# Patient Record
Sex: MALE | Race: ASIAN | NOT HISPANIC OR LATINO | URBAN - METROPOLITAN AREA
[De-identification: names, ages, dates, MRNs, and addresses within clinical notes are randomized per-mention and may not be internally consistent; named-entity substitution may affect disease eponyms.]

---

## 2023-12-06 ENCOUNTER — LAB OUTSIDE AN ENCOUNTER (OUTPATIENT)
Dept: URBAN - METROPOLITAN AREA CLINIC 105 | Facility: CLINIC | Age: 38
End: 2023-12-06

## 2023-12-06 ENCOUNTER — OFFICE VISIT (OUTPATIENT)
Dept: URBAN - METROPOLITAN AREA CLINIC 105 | Facility: CLINIC | Age: 38
End: 2023-12-06
Payer: COMMERCIAL

## 2023-12-06 VITALS
TEMPERATURE: 97.1 F | HEIGHT: 72 IN | WEIGHT: 270 LBS | HEART RATE: 78 BPM | SYSTOLIC BLOOD PRESSURE: 147 MMHG | BODY MASS INDEX: 36.57 KG/M2 | DIASTOLIC BLOOD PRESSURE: 87 MMHG

## 2023-12-06 DIAGNOSIS — R19.4 CHANGE IN BOWEL HABITS: ICD-10-CM

## 2023-12-06 DIAGNOSIS — R10.9 ABDOMINAL CRAMPING: ICD-10-CM

## 2023-12-06 DIAGNOSIS — R30.0 DYSURIA: ICD-10-CM

## 2023-12-06 DIAGNOSIS — R10.30 LOWER ABDOMINAL PAIN: ICD-10-CM

## 2023-12-06 DIAGNOSIS — R19.5 MUCUS IN STOOL: ICD-10-CM

## 2023-12-06 PROCEDURE — 99203 OFFICE O/P NEW LOW 30 MIN: CPT | Performed by: INTERNAL MEDICINE

## 2023-12-06 RX ORDER — MOXIFLOXACIN HYDROCHLORIDE 400 MG/1
1 TABLET TABLET, FILM COATED ORAL ONCE A DAY
Qty: 10 TABLET | Refills: 0 | OUTPATIENT
Start: 2023-12-06 | End: 2023-12-16

## 2023-12-06 NOTE — HPI-TODAY'S VISIT:
symptoms started around Thanksgiving. he ate late and ended up vomiting. next day he felt like his stomach was cramping in the lower abdomen. he did not have a bowel movement all day. he took some laxatives and had a bowel movement the next day. abdominal cramping started to get better. - he ate ice cream and he has been tender in the lower abdomen area. he has a sharp pain with a bowel movment. not having blood in the stool.  he says that he can't eat b/c he does not have much of an appetite. he is having some pain with urine. - he went to urgent care and had a urine test and an xray.

## 2023-12-07 ENCOUNTER — TELEPHONE ENCOUNTER (OUTPATIENT)
Dept: URBAN - METROPOLITAN AREA CLINIC 105 | Facility: CLINIC | Age: 38
End: 2023-12-07

## 2023-12-07 LAB
A/G RATIO: 1.4
ALBUMIN: 4.6
ALKALINE PHOSPHATASE: 144
ALT (SGPT): 70
AST (SGOT): 41
BILIRUBIN, TOTAL: 0.8
BUN/CREATININE RATIO: (no result)
BUN: 8
C-REACTIVE PROTEIN, QUANT: 201.9
CALCIUM: 9.8
CARBON DIOXIDE, TOTAL: 26
CHLORIDE: 101
CREATININE: 0.97
EGFR: 104
GLOBULIN, TOTAL: 3.4
GLUCOSE: 100
HEMATOCRIT: 43.7
HEMOGLOBIN: 14.5
MCH: 28.1
MCHC: 33.2
MCV: 84.7
MPV: 10.3
PLATELET COUNT: 449
POTASSIUM: 4.4
PROTEIN, TOTAL: 8
RDW: 12.3
RED BLOOD CELL COUNT: 5.16
SODIUM: 138
WHITE BLOOD CELL COUNT: 18.7

## 2023-12-08 ENCOUNTER — CLAIMS CREATED FROM THE CLAIM WINDOW (OUTPATIENT)
Dept: URBAN - METROPOLITAN AREA MEDICAL CENTER 33 | Facility: MEDICAL CENTER | Age: 38
End: 2023-12-08
Payer: COMMERCIAL

## 2023-12-08 DIAGNOSIS — K57.20 ABSCESS OF CECUM CONCURRENT WITH AND DUE TO DIVERTICULITIS: ICD-10-CM

## 2023-12-08 PROCEDURE — 99255 IP/OBS CONSLTJ NEW/EST HI 80: CPT | Performed by: INTERNAL MEDICINE

## 2023-12-09 ENCOUNTER — CLAIMS CREATED FROM THE CLAIM WINDOW (OUTPATIENT)
Dept: URBAN - METROPOLITAN AREA MEDICAL CENTER 33 | Facility: MEDICAL CENTER | Age: 38
End: 2023-12-09
Payer: COMMERCIAL

## 2023-12-09 DIAGNOSIS — K57.20 ABSCESS OF CECUM CONCURRENT WITH AND DUE TO DIVERTICULITIS: ICD-10-CM

## 2023-12-09 PROCEDURE — 99233 SBSQ HOSP IP/OBS HIGH 50: CPT | Performed by: INTERNAL MEDICINE

## 2023-12-13 ENCOUNTER — TELEPHONE ENCOUNTER (OUTPATIENT)
Dept: URBAN - METROPOLITAN AREA CLINIC 105 | Facility: CLINIC | Age: 38
End: 2023-12-13

## 2024-01-31 ENCOUNTER — OFFICE VISIT (OUTPATIENT)
Dept: URBAN - METROPOLITAN AREA CLINIC 105 | Facility: CLINIC | Age: 39
End: 2024-01-31
Payer: COMMERCIAL

## 2024-01-31 ENCOUNTER — DASHBOARD ENCOUNTERS (OUTPATIENT)
Age: 39
End: 2024-01-31

## 2024-01-31 VITALS
HEART RATE: 67 BPM | WEIGHT: 259.8 LBS | TEMPERATURE: 97.1 F | BODY MASS INDEX: 35.19 KG/M2 | SYSTOLIC BLOOD PRESSURE: 126 MMHG | DIASTOLIC BLOOD PRESSURE: 86 MMHG | HEIGHT: 72 IN

## 2024-01-31 DIAGNOSIS — R10.32 LLQ ABDOMINAL PAIN: ICD-10-CM

## 2024-01-31 DIAGNOSIS — R93.5 ABNORMAL ABDOMINAL CT SCAN: ICD-10-CM

## 2024-01-31 DIAGNOSIS — K57.92 DIVERTICULITIS: ICD-10-CM

## 2024-01-31 DIAGNOSIS — K57.20 DIVERTICULITIS OF LARGE INTESTINE WITH ABSCESS WITHOUT BLEEDING: ICD-10-CM

## 2024-01-31 PROBLEM — 307496006: Status: ACTIVE | Noted: 2024-01-31

## 2024-01-31 PROBLEM — 4494009: Status: ACTIVE | Noted: 2024-01-31

## 2024-01-31 PROCEDURE — 99213 OFFICE O/P EST LOW 20 MIN: CPT | Performed by: INTERNAL MEDICINE

## 2024-01-31 RX ORDER — SODIUM, POTASSIUM,MAG SULFATES 17.5-3.13G
177 ML SOLUTION, RECONSTITUTED, ORAL ORAL
Qty: 1 KIT | Refills: 0 | OUTPATIENT
Start: 2024-01-31 | End: 2024-02-01

## 2024-01-31 RX ORDER — BISACODYL 5 MG
TAKE 4 TABLET, DELAYED RELEASE (ENTERIC COATED) ORAL
Qty: 4 | OUTPATIENT
Start: 2024-01-31 | End: 2024-02-01

## 2024-01-31 NOTE — HPI-TODAY'S VISIT:
symptoms started around Thanksgiving. he ate late and ended up vomiting. next day he felt like his stomach was cramping in the lower abdomen. he did not have a bowel movement all day. he took some laxatives and had a bowel movement the next day. abdominal cramping started to get better. - he ate ice cream and he has been tender in the lower abdomen area. he has a sharp pain with a bowel movment. not having blood in the stool.  he says that he can't eat b/c he does not have much of an appetite. he is having some pain with urine. - he went to urgent care and had a urine test and an xray. - 1/31/2024: pt comes for follow up. he came with LLQ abdominal pain with lower urinary tract symptoms. CT showed acute divertiulitis with a 3cm abscess. he was treated with cipro/flagyl. he ultimately did not have to have a   Percutaneous drain placed all of his urinary symptoms have resolved as has his pain and change of bowel habits.

## 2024-02-01 ENCOUNTER — OV EP (OUTPATIENT)
Dept: URBAN - METROPOLITAN AREA CLINIC 105 | Facility: CLINIC | Age: 39
End: 2024-02-01

## 2024-03-07 ENCOUNTER — OV EP (OUTPATIENT)
Dept: URBAN - METROPOLITAN AREA CLINIC 105 | Facility: CLINIC | Age: 39
End: 2024-03-07

## 2024-03-08 ENCOUNTER — LAB (OUTPATIENT)
Dept: URBAN - METROPOLITAN AREA CLINIC 4 | Facility: CLINIC | Age: 39
End: 2024-03-08
Payer: COMMERCIAL

## 2024-03-08 ENCOUNTER — COLON (OUTPATIENT)
Dept: URBAN - METROPOLITAN AREA SURGERY CENTER 16 | Facility: SURGERY CENTER | Age: 39
End: 2024-03-08
Payer: COMMERCIAL

## 2024-03-08 DIAGNOSIS — R93.3 ABN FINDINGS-GI TRACT: ICD-10-CM

## 2024-03-08 DIAGNOSIS — R10.32 ABDOMINAL CRAMPING IN LEFT LOWER QUADRANT: ICD-10-CM

## 2024-03-08 DIAGNOSIS — D12.2 BENIGN NEOPLASM OF ASCENDING COLON: ICD-10-CM

## 2024-03-08 DIAGNOSIS — D12.2 ADENOMA OF ASCENDING COLON: ICD-10-CM

## 2024-03-08 PROCEDURE — 45385 COLONOSCOPY W/LESION REMOVAL: CPT | Performed by: INTERNAL MEDICINE

## 2024-03-08 PROCEDURE — 88305 TISSUE EXAM BY PATHOLOGIST: CPT | Performed by: PATHOLOGY
